# Patient Record
Sex: FEMALE | Race: WHITE | Employment: STUDENT | URBAN - METROPOLITAN AREA
[De-identification: names, ages, dates, MRNs, and addresses within clinical notes are randomized per-mention and may not be internally consistent; named-entity substitution may affect disease eponyms.]

---
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
## 2022-01-08 ENCOUNTER — HEALTH MAINTENANCE LETTER (OUTPATIENT)
Age: 22
End: 2022-01-08

## 2022-03-21 ENCOUNTER — LAB REQUISITION (OUTPATIENT)
Dept: LAB | Facility: CLINIC | Age: 22
End: 2022-03-21

## 2022-03-21 PROCEDURE — 88342 IMHCHEM/IMCYTCHM 1ST ANTB: CPT | Mod: TC | Performed by: DENTIST

## 2022-07-15 NOTE — PROGRESS NOTES
Progress Note    SUBJECTIVE:  Ruth Cortes is an 22 year old, , who requests an Annual Preventive Exam.     Concerns today include:     1. Genital bump/ lesion: Pt complains of a bump that has been there since the beginning of . She thought it was a pimple, she popped it and pus came out of it. Now it's less swollen but still present. Denies heat, pain or tenderness. States she does shave that area.      Social Hx:  - Reports her mental health is pretty good. She has adequate coping mechanisms with journal and talking with friends/family. Feels supported by friends and family.   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 Attends [employer] where she is on the swim team. She had a very successful team this past school year.    Menstrual Hx:   - Regular periods  - Her cycle is roughly every 28 days   - Menses lasts 4-5 days with light bleeding and spots for 1-2 days following. Denies bleeding through a pad or tampon in less than a hour.   - Additional symptoms with her menses are occasional cramping that resolves with ibuprofen/tyelnol     Menstrual History:  Menstrual History 2021   LAST MENSTRUAL PERIOD 2021 -   Menarche Age - - -   Period Cycle (Days) - - 28   Period Duration (Days) - - 5-6   Method of Contraception - - None   Period Pattern - - Regular   Menstrual Flow - - Moderate   Menstrual Control - - -   Dysmenorrhea - - -   PMS Symptoms - - -   Reviewed Today - - Yes     Sexual Hx:   - Currently sexually active with male partners  - Has had 2 partners in the last year  - Desires STD testing   - Uses Mirena IUD for birth control: placed 2019; pleased with this contraceptive method.   - Denies sexual concerns     Diet/Exercise   - Doesn't follow a specific diet. Eats fruits/vegetables regularly. Eats chicken mainly for protein with red meat every so often and supplements with  protein powder.   - Exercises everyday with swimming and weightlifting.   - Eats significant amounts of calcium through milk and cheese.   - Doesn't take any vitamins or supplements.     Immunizations up to date     Pap smear not indicated until the age of 23    HISTORY:  albuterol (PROAIR HFA/PROVENTIL HFA/VENTOLIN HFA) 108 (90 Base) MCG/ACT inhaler, Inhale 1-2 puffs into the lungs every 6 hours as needed for shortness of breath / dyspnea or wheezing    No current facility-administered medications on file prior to visit.    Allergies   Allergen Reactions     No Known Allergies      Immunization History   Administered Date(s) Administered     COVID-19,PF,Pfizer (12+ Yrs) 2021, 2021     DTAP (<7y) 2002, 2002, 2003, 10/07/2003, 2007     FLU 6-35 months 10/31/2006, 2009, 2010     HEPA 2005, 10/31/2006     HPV 2013, 2016     Hep B, Peds or Adolescent 2002, 2003     HepA-ped 2 Dose 2005, 10/31/2006     HepB 2002, 2002, 2003     Hib (PRP-T) 2002, 2002, 2003, 10/07/2003     Influenza (H1N1) 2009, 2010     Influenza (IIV3) PF 2003, 10/26/2005, 10/31/2006, 10/30/2007, 2009, 2010     MMR 2003, 2007     Meningococcal (Menactra ) 2013, 2018     Meningococcal (Menveo ) 2013     Pneumococcal (PCV 7) 2002, 2002, 2003, 10/07/2003     Poliovirus, inactivated (IPV) 2002, 2002, 2003, 2007     TDAP Vaccine (Boostrix) 2013     Varicella 2003, 2007       OB History    Para Term  AB Living   0 0 0 0 0 0   SAB IAB Ectopic Multiple Live Births   0 0 0 0 0     Past Medical History:   Diagnosis Date     Skin reaction to the sun 2006     Past Surgical History:   Procedure Laterality Date     NO HISTORY OF SURGERY       Family History   Problem Relation Age of Onset     Hypertension  "Maternal Grandmother      Hypertension Maternal Uncle      Breast Cancer No family hx of      Colon Cancer No family hx of      Thyroid Disease No family hx of      Ovarian Cancer No family hx of      Social History     Socioeconomic History     Marital status: Single     Spouse name: None     Number of children: None     Years of education: None     Highest education level: None   Tobacco Use     Smoking status: Never Smoker     Smokeless tobacco: Never Used   Substance and Sexual Activity     Alcohol use: No     Drug use: No     Sexual activity: Never   Social History Narrative    Family Information:    Parent #1    Name: Michelle    Education: college   Occupation: HR rep        Relationship Status of Parent(s): single    Who does the child live with? mother and sister(s)    What language(s) is/are spoken at home? English        How much exercise per week? Swimming daily    How much calcium per day? In foods       How much caffeine per day? 0    How much vitamin D per day? In foods and sunligth    Do you/your family wear seatbelts?  Yes    Do you/your family use safety helmets? Yes    Do you/your family use sunscreen? Yes    Do you/your family keep firearms in the home? No    Do you/your family have a smoke detector(s)? Yes        Reviewed [facility] 3-           Review of Systems     All other systems reviewed and are negative.    PHQ-9 SCORE 3/28/2018 11/5/2018 9/10/2019   PHQ-9 Total Score 1 - 2   PHQ-A Total Score - 1 -     PHQ-2 Score:     PHQ-2 ( 1999 Pfizer) 9/10/2019 8/1/2018   Q1: Little interest or pleasure in doing things 0 0   Q2: Feeling down, depressed or hopeless 0 0   PHQ-2 Score 0 0   PHQ-2 Total Score (12-17 Years)- Positive if 3 or more points; Administer PHQ-A if positive 0 -     MAIRA-7 SCORE 3/28/2018 9/10/2019   Total Score 3 6     EXAM:  Blood pressure 129/89, pulse 75, height 1.702 m (5' 7\"), weight 66.4 kg (146 lb 6.4 oz), last menstrual period 07/11/2022, not currently breastfeeding. " Body mass index is 22.93 kg/m .  General - pleasant female in no acute distress.  Skin - no suspicious lesions or rashes  EENT-   euthyroid with out palpable nodules  Neck - supple without lymphadenopathy.  Lungs - clear to auscultation bilaterally.  Heart - regular rate and rhythm without murmur.  Abdomen - soft, nontender, nondistended, no masses or organomegaly noted.  Musculoskeletal - no gross deformities.  Neurological - normal strength, sensation, and mental status.    Breast Exam:  Deferred due to age < 25 years old.       Pelvic Exam:  EG/BUS: Small skin colored scabbed lesion on mons consistent with healing folliculitis lesion; Normal genital architecture without erythema or abnormal secretions; Bartholin's, Urethra, Bonaparte's normal   Urethral meatus: normal  Urethra: no masses, tenderness, or scarring    IUD strings palpable on bimanual exam.     ASSESSMENT:  Encounter Diagnoses   Name Primary?     Visit for preventive health examination Yes     Screen for STD (sexually transmitted disease)      Genital lesion, female         PLAN:   - STD screening collected.   - Discussed initiation of cervical cancer screening with a pap smear at the age of 23.   - Educated on techniques to avoid recurrence of folliculitis such as avoidance of shaving that area and good hygiene.   - Normal IUD string check. Due for removal/ replacement by 2026.     Additional teaching done at this visit regarding calcium (1200 mg per day), exercise, birth control, mental health and weight/diet.    Return to clinic in one year.  Follow-up as needed.    I, Nicol Mccallum, am serving as a scribe; to document services personally performed by  Vero Brewer based on data collection and the provider's statements to me.     Nicol Mccallum, BSN, RN, [facility]     I agree with the PFSH and ROS as completed by the NP Student, except for changes made by me.  The remainder of the encounter was performed by me and scribed by the  WHNP Student.  The scribed note accurately reflects my personal services and decisions made by me.  Vero Brewer, DNP, APRN, WHNP

## 2022-07-21 ENCOUNTER — OFFICE VISIT (OUTPATIENT)
Dept: OBGYN | Facility: CLINIC | Age: 22
End: 2022-07-21
Attending: NURSE PRACTITIONER
Payer: COMMERCIAL

## 2022-07-21 VITALS
SYSTOLIC BLOOD PRESSURE: 129 MMHG | BODY MASS INDEX: 22.98 KG/M2 | HEART RATE: 75 BPM | HEIGHT: 67 IN | DIASTOLIC BLOOD PRESSURE: 89 MMHG | WEIGHT: 146.4 LBS

## 2022-07-21 DIAGNOSIS — Z00.00 VISIT FOR PREVENTIVE HEALTH EXAMINATION: Primary | ICD-10-CM

## 2022-07-21 DIAGNOSIS — N94.9 GENITAL LESION, FEMALE: ICD-10-CM

## 2022-07-21 DIAGNOSIS — Z11.3 SCREEN FOR STD (SEXUALLY TRANSMITTED DISEASE): ICD-10-CM

## 2022-07-21 DIAGNOSIS — Z30.431 INTRAUTERINE DEVICE SURVEILLANCE: ICD-10-CM

## 2022-07-21 PROCEDURE — G0463 HOSPITAL OUTPT CLINIC VISIT: HCPCS

## 2022-07-21 PROCEDURE — 87591 N.GONORRHOEAE DNA AMP PROB: CPT | Performed by: NURSE PRACTITIONER

## 2022-07-21 PROCEDURE — 99395 PREV VISIT EST AGE 18-39: CPT | Performed by: NURSE PRACTITIONER

## 2022-07-21 PROCEDURE — 87491 CHLMYD TRACH DNA AMP PROBE: CPT | Performed by: NURSE PRACTITIONER

## 2022-07-22 LAB
C TRACH DNA SPEC QL NAA+PROBE: NEGATIVE
N GONORRHOEA DNA SPEC QL NAA+PROBE: NEGATIVE

## 2022-11-20 ENCOUNTER — HEALTH MAINTENANCE LETTER (OUTPATIENT)
Age: 22
End: 2022-11-20

## 2023-07-11 ENCOUNTER — PATIENT OUTREACH (OUTPATIENT)
Dept: CARE COORDINATION | Facility: CLINIC | Age: 23
End: 2023-07-11
Payer: COMMERCIAL

## 2023-07-25 ENCOUNTER — PATIENT OUTREACH (OUTPATIENT)
Dept: CARE COORDINATION | Facility: CLINIC | Age: 23
End: 2023-07-25
Payer: COMMERCIAL

## 2023-09-16 ENCOUNTER — HEALTH MAINTENANCE LETTER (OUTPATIENT)
Age: 23
End: 2023-09-16

## 2024-05-13 ASSESSMENT — ANXIETY QUESTIONNAIRES
2. NOT BEING ABLE TO STOP OR CONTROL WORRYING: NOT AT ALL
5. BEING SO RESTLESS THAT IT IS HARD TO SIT STILL: NOT AT ALL
7. FEELING AFRAID AS IF SOMETHING AWFUL MIGHT HAPPEN: NOT AT ALL
GAD7 TOTAL SCORE: 0
GAD7 TOTAL SCORE: 0
6. BECOMING EASILY ANNOYED OR IRRITABLE: NOT AT ALL
1. FEELING NERVOUS, ANXIOUS, OR ON EDGE: NOT AT ALL
7. FEELING AFRAID AS IF SOMETHING AWFUL MIGHT HAPPEN: NOT AT ALL
4. TROUBLE RELAXING: NOT AT ALL
8. IF YOU CHECKED OFF ANY PROBLEMS, HOW DIFFICULT HAVE THESE MADE IT FOR YOU TO DO YOUR WORK, TAKE CARE OF THINGS AT HOME, OR GET ALONG WITH OTHER PEOPLE?: NOT DIFFICULT AT ALL
IF YOU CHECKED OFF ANY PROBLEMS ON THIS QUESTIONNAIRE, HOW DIFFICULT HAVE THESE PROBLEMS MADE IT FOR YOU TO DO YOUR WORK, TAKE CARE OF THINGS AT HOME, OR GET ALONG WITH OTHER PEOPLE: NOT DIFFICULT AT ALL
3. WORRYING TOO MUCH ABOUT DIFFERENT THINGS: NOT AT ALL

## 2024-05-14 ENCOUNTER — OFFICE VISIT (OUTPATIENT)
Dept: OBGYN | Facility: CLINIC | Age: 24
End: 2024-05-14
Attending: NURSE PRACTITIONER
Payer: COMMERCIAL

## 2024-05-14 VITALS
WEIGHT: 163 LBS | BODY MASS INDEX: 25.58 KG/M2 | HEIGHT: 67 IN | SYSTOLIC BLOOD PRESSURE: 139 MMHG | HEART RATE: 93 BPM | DIASTOLIC BLOOD PRESSURE: 89 MMHG

## 2024-05-14 DIAGNOSIS — Z12.4 SCREENING FOR CERVICAL CANCER: ICD-10-CM

## 2024-05-14 DIAGNOSIS — Z00.00 VISIT FOR PREVENTIVE HEALTH EXAMINATION: Primary | ICD-10-CM

## 2024-05-14 DIAGNOSIS — Z30.431 INTRAUTERINE DEVICE SURVEILLANCE: ICD-10-CM

## 2024-05-14 PROCEDURE — 99213 OFFICE O/P EST LOW 20 MIN: CPT | Performed by: NURSE PRACTITIONER

## 2024-05-14 PROCEDURE — G0145 SCR C/V CYTO,THINLAYER,RESCR: HCPCS | Performed by: NURSE PRACTITIONER

## 2024-05-14 PROCEDURE — 99395 PREV VISIT EST AGE 18-39: CPT | Performed by: NURSE PRACTITIONER

## 2024-05-14 NOTE — PATIENT INSTRUCTIONS
Thank you for trusting us with your care!     If you need to contact us for questions about:  Symptoms, Scheduling & Medical Questions; Non-urgent (2-3 day response) Amy message, Urgent (needing response today) [phone] (if after 3:30pm next day response)   Prescriptions: Please call your Pharmacy   Billing: [place] [phone] or [facility]:[phone]

## 2024-05-14 NOTE — PROGRESS NOTES
Progress Note    SUBJECTIVE:  Ruth Cortes is an 23 year old, , who requests an Annual Preventive Exam.     Concerns today include:     Social Hx:  - Reports her mental health is pretty good. Feels supported by friends and family.   - Graduated college. Done swimming. 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      Menstrual Hx:   - Irregular periods; still light with the LNG-IUD, bleeds for about 2 days. Episodes are 1-5 months apart. No significant dysmenorrhea.     Sexual Hx:   - Currently sexually active with male partners  - Has had 1 partner in the last year  - Declines STD testing, had it done recently   - Uses Mirena IUD for birth control: placed 2019; pleased with this contraceptive method.   - Denies sexual concerns      Diet/Exercise   - Doesn't follow a specific diet. Overall well balanced; good some calcium in her diet  - Exercises: yoga and walking, getting into a routine now that swim team is done     Immunizations: Tdap      Pap smear: due for first  Mammogram: n/a  Colonoscopy: n/a    Menstrual History:      2022     3:24 PM 2022     3:30 PM 2024     2:20 PM   Menstrual History   LAST MENSTRUAL PERIOD  2022   Period Cycle (Days) 28     Period Duration (Days) 5-6     Method of Contraception None     Period Pattern Regular     Menstrual Flow Moderate     Reviewed Today Yes       HISTORY:  Current Outpatient Medications   Medication Sig Dispense Refill    albuterol (PROAIR HFA/PROVENTIL HFA/VENTOLIN HFA) 108 (90 Base) MCG/ACT inhaler Inhale 1-2 puffs into the lungs every 6 hours as needed for shortness of breath / dyspnea or wheezing 18 g 3    levonorgestrel (MIRENA) 52 MG (20 mcg/day) IUD        No current facility-administered medications for this visit.     Allergies   Allergen Reactions    No Known Allergies      Immunization History   Administered Date(s)  Administered    COVID-19 MONOVALENT 12+ (Pfizer) 2021, 2021    DTAP (<7y) 2002, 2002, 2003, 10/07/2003, 2007    Flu, Unspecified 10/21/2020    HEPA 2005, 10/31/2006    HEPATITIS A (PEDS 12M-18Y) 2005, 10/31/2006    HIB (PRP-T) 2002, 2002, 2003, 10/07/2003    HPV 2013, 2016    HepB 2002, 2002, 2003    Hepatitis B, Peds 2002, 2003    Influenza (H1N1) 2009, 2010    Influenza (IIV3) PF 2003, 10/26/2005, 10/31/2006, 10/30/2007, 2009, 2010    Influenza, seasonal, injectable, PF 10/31/2006, 2009, 2010    MMR 2003, 2007    Meningococcal ACWY (Menactra ) 2013, 2018    Meningococcal ACWY (Menveo ) 2013    Pneumococcal (PCV 7) 2002, 2002, 2003, 10/07/2003    Poliovirus, inactivated (IPV) 2002, 2002, 2003, 2007    TDAP (Adacel,Boostrix) 2024    TDAP Vaccine (Boostrix) 2013    Varicella 2003, 2007       OB History    Para Term  AB Living   0 0 0 0 0 0   SAB IAB Ectopic Multiple Live Births   0 0 0 0 0     Past Medical History:   Diagnosis Date    Hypertension     Skin reaction to the sun 2006     Past Surgical History:   Procedure Laterality Date    NO HISTORY OF SURGERY       Family History   Problem Relation Age of Onset    Hypertension Maternal Grandmother     Hypertension Maternal Uncle     Breast Cancer No family hx of     Colon Cancer No family hx of     Thyroid Disease No family hx of     Ovarian Cancer No family hx of      Social History     Socioeconomic History    Marital status: Single     Spouse name: None    Number of children: None    Years of education: None    Highest education level: None   Tobacco Use    Smoking status: Never    Smokeless tobacco: Never   Vaping Use    Vaping status: Never Used   Substance and Sexual Activity    Alcohol use: Yes      "Comment: 5 drinks a week    Drug use: No    Sexual activity: Yes     Partners: Male     Birth control/protection: Condom, I.U.D.   Social History Narrative    Family Information:    Parent #1    Name: Michelle    Education: college   Occupation: HR rep        Relationship Status of Parent(s): single    Who does the child live with? mother and sister(s)    What language(s) is/are spoken at home? English        How much exercise per week? Swimming daily    How much calcium per day? In foods       How much caffeine per day? 0    How much vitamin D per day? In foods and sunligth    Do you/your family wear seatbelts?  Yes    Do you/your family use safety helmets? Yes    Do you/your family use sunscreen? Yes    Do you/your family keep firearms in the home? No    Do you/your family have a smoke detector(s)? Yes        Reviewed [facility] 3-           ROS  ROS: 10 point ROS neg other than the symptoms noted above in the HPI.        3/28/2018     1:29 PM 11/5/2018     2:04 PM 9/10/2019    11:40 AM   PHQ-9 SCORE   PHQ-9 Total Score 1  2   PHQ-A Total Score  1    PHQ-2 Score:         5/13/2024    10:07 PM 9/10/2019    11:40 AM   PHQ-2 ( 1999 Pfizer)   Q1: Little interest or pleasure in doing things 0 0   Q2: Feeling down, depressed or hopeless 0 0   PHQ-2 Score 0 0   PHQ-2 Total Score (12-17 Years)- Positive if 3 or more points; Administer PHQ-A if positive  0   Q1: Little interest or pleasure in doing things Not at all    Q2: Feeling down, depressed or hopeless Not at all    PHQ-2 Score 0            3/28/2018     1:29 PM 9/10/2019    11:40 AM 5/13/2024    10:07 PM   MAIRA-7 SCORE   Total Score   0 (minimal anxiety)   Total Score 3 6 0       EXAM:  Blood pressure 139/89, pulse 93, height 1.702 m (5' 7\"), weight 73.9 kg (163 lb), last menstrual period 05/01/2024, not currently breastfeeding. Body mass index is 25.53 kg/m .  General - pleasant female in no acute distress.  Skin - no suspicious lesions or rashes  EENT-  " euthyroid with out palpable nodules  Neck - supple without lymphadenopathy.  Lungs - clear to auscultation bilaterally.  Heart - regular rate and rhythm without murmur.  Abdomen - soft, nontender, nondistended, no masses or organomegaly noted.  Musculoskeletal - no gross deformities.  Neurological - normal strength, sensation, and mental status.    Breast Exam: deferred due to age <25    Pelvic Exam:  EG/BUS: Normal genital architecture without lesions, erythema or abnormal secretions; Bartholin's, Urethra, Shubert's normal   Urethral meatus: normal   Urethra: no masses, tenderness, or scarring   Vagina: moist, pink, rugae with creamy, white, and odorless secretions; IUD strings extend 3cm from the external cervical os  Cervix: pink, moist, closed, without lesion   Rectum: anus normal     ASSESSMENT:  Encounter Diagnoses   Name Primary?    Visit for preventive health examination Yes    Screening for cervical cancer     Intrauterine device surveillance         PLAN:   Orders Placed This Encounter   Procedures    TDAP VACCINE (Adacel, Boostrix)  [[id number]]    Gynecologic Cytology (PAP Smear)     Defers lipid profile today  Tdap given  Pap smear today  Normal IUD string check; due for removal/ replacement 2027  Additional teaching done at this visit regarding calcium (1200 mg per day), self breast awareness, exercise, mental health.     Return to clinic in one year.  Follow-up as needed.  Vero Brewer, DNP, APRN, WHNP

## 2024-05-14 NOTE — LETTER
2024       RE: Ruth Cortes  [address]  [facility] [address]     Dear Colleague,    Thank you for referring your patient, Ruth Cortes, to the [facility] [place] at [facility]. Please see a copy of my visit note below.    Progress Note    SUBJECTIVE:  Ruth Cortes is an 23 year old, , who requests an Annual Preventive Exam.     Concerns today include:     Social Hx:  - Reports her mental health is pretty good. Feels supported by friends and family.   - Graduated college. Done swimming. 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      Menstrual Hx:   - Irregular periods; still light with the LNG-IUD, bleeds for about 2 days. Episodes are 1-5 months apart. No significant dysmenorrhea.     Sexual Hx:   - Currently sexually active with male partners  - Has had 1 partner in the last year  - Declines STD testing, had it done recently   - Uses Mirena IUD for birth control: placed 2019; pleased with this contraceptive method.   - Denies sexual concerns      Diet/Exercise   - Doesn't follow a specific diet. Overall well balanced; good some calcium in her diet  - Exercises: yoga and walking, getting into a routine now that swim team is done     Immunizations: Tdap      Pap smear: due for first  Mammogram: n/a  Colonoscopy: n/a    Menstrual History:      2022     3:24 PM 2022     3:30 PM 2024     2:20 PM   Menstrual History   LAST MENSTRUAL PERIOD  2022   Period Cycle (Days) 28     Period Duration (Days) 5-6     Method of Contraception None     Period Pattern Regular     Menstrual Flow Moderate     Reviewed Today Yes       HISTORY:  Current Outpatient Medications   Medication Sig Dispense Refill    albuterol (PROAIR HFA/PROVENTIL HFA/VENTOLIN HFA) 108 (90 Base) MCG/ACT inhaler Inhale 1-2  puffs into the lungs every 6 hours as needed for shortness of breath / dyspnea or wheezing 18 g 3    levonorgestrel (MIRENA) 52 MG (20 mcg/day) IUD        No current facility-administered medications for this visit.     Allergies   Allergen Reactions    No Known Allergies      Immunization History   Administered Date(s) Administered    COVID-19 MONOVALENT 12+ (Pfizer) 2021, 2021    DTAP (<7y) 2002, 2002, 2003, 10/07/2003, 2007    Flu, Unspecified 10/21/2020    HEPA 2005, 10/31/2006    HEPATITIS A (PEDS 12M-18Y) 2005, 10/31/2006    HIB (PRP-T) 2002, 2002, 2003, 10/07/2003    HPV 2013, 2016    HepB 2002, 2002, 2003    Hepatitis B, Peds 2002, 2003    Influenza (H1N1) 2009, 2010    Influenza (IIV3) PF 2003, 10/26/2005, 10/31/2006, 10/30/2007, 2009, 2010    Influenza, seasonal, injectable, PF 10/31/2006, 2009, 2010    MMR 2003, 2007    Meningococcal ACWY (Menactra ) 2013, 2018    Meningococcal ACWY (Menveo ) 2013    Pneumococcal (PCV 7) 2002, 2002, 2003, 10/07/2003    Poliovirus, inactivated (IPV) 2002, 2002, 2003, 2007    TDAP (Adacel,Boostrix) 2024    TDAP Vaccine (Boostrix) 2013    Varicella 2003, 2007       OB History    Para Term  AB Living   0 0 0 0 0 0   SAB IAB Ectopic Multiple Live Births   0 0 0 0 0     Past Medical History:   Diagnosis Date    Hypertension     Skin reaction to the sun 2006     Past Surgical History:   Procedure Laterality Date    NO HISTORY OF SURGERY       Family History   Problem Relation Age of Onset    Hypertension Maternal Grandmother     Hypertension Maternal Uncle     Breast Cancer No family hx of     Colon Cancer No family hx of     Thyroid Disease No family hx of     Ovarian Cancer No family hx of      Social History      Socioeconomic History    Marital status: Single     Spouse name: None    Number of children: None    Years of education: None    Highest education level: None   Tobacco Use    Smoking status: Never    Smokeless tobacco: Never   Vaping Use    Vaping status: Never Used   Substance and Sexual Activity    Alcohol use: Yes     Comment: 5 drinks a week    Drug use: No    Sexual activity: Yes     Partners: Male     Birth control/protection: Condom, I.U.D.   Social History Narrative    Family Information:    Parent #1    Name: Michelle    Education: college   Occupation: HR rep        Relationship Status of Parent(s): single    Who does the child live with? mother and sister(s)    What language(s) is/are spoken at home? English        How much exercise per week? Swimming daily    How much calcium per day? In foods       How much caffeine per day? 0    How much vitamin D per day? In foods and sunligth    Do you/your family wear seatbelts?  Yes    Do you/your family use safety helmets? Yes    Do you/your family use sunscreen? Yes    Do you/your family keep firearms in the home? No    Do you/your family have a smoke detector(s)? Yes        [facility]           ROS  ROS: 10 point ROS neg other than the symptoms noted above in the HPI.        3/28/2018     1:29 PM 11/5/2018     2:04 PM 9/10/2019    11:40 AM   PHQ-9 SCORE   PHQ-9 Total Score 1  2   PHQ-A Total Score  1    PHQ-2 Score:         5/13/2024    10:07 PM 9/10/2019    11:40 AM   PHQ-2 ( 1999 Pfizer)   Q1: Little interest or pleasure in doing things 0 0   Q2: Feeling down, depressed or hopeless 0 0   PHQ-2 Score 0 0   PHQ-2 Total Score (12-17 Years)- Positive if 3 or more points; Administer PHQ-A if positive  0   Q1: Little interest or pleasure in doing things Not at all    Q2: Feeling down, depressed or hopeless Not at all    PHQ-2 Score 0            3/28/2018     1:29 PM 9/10/2019    11:40 AM 5/13/2024    10:07 PM   MAIRA-7 SCORE   Total Score   0  "(minimal anxiety)   Total Score 3 6 0       EXAM:  Blood pressure 139/89, pulse 93, height 1.702 m (5' 7\"), weight 73.9 kg (163 lb), last menstrual period 05/01/2024, not currently breastfeeding. Body mass index is 25.53 kg/m .  General - pleasant female in no acute distress.  Skin - no suspicious lesions or rashes  EENT-  euthyroid with out palpable nodules  Neck - supple without lymphadenopathy.  Lungs - clear to auscultation bilaterally.  Heart - regular rate and rhythm without murmur.  Abdomen - soft, nontender, nondistended, no masses or organomegaly noted.  Musculoskeletal - no gross deformities.  Neurological - normal strength, sensation, and mental status.    Breast Exam: deferred due to age <25    Pelvic Exam:  EG/BUS: Normal genital architecture without lesions, erythema or abnormal secretions; Bartholin's, Urethra, Clarkfield's normal   Urethral meatus: normal   Urethra: no masses, tenderness, or scarring   Vagina: moist, pink, rugae with creamy, white, and odorless secretions; IUD strings extend 3cm from the external cervical os  Cervix: pink, moist, closed, without lesion   Rectum: anus normal     ASSESSMENT:  Encounter Diagnoses   Name Primary?    Visit for preventive health examination Yes    Screening for cervical cancer     Intrauterine device surveillance         PLAN:   Orders Placed This Encounter   Procedures    TDAP VACCINE (Adacel, Boostrix)  [[id number]]    Gynecologic Cytology (PAP Smear)     Defers lipid profile today  Tdap given  Pap smear today  Normal IUD string check; due for removal/ replacement 2027  Additional teaching done at this visit regarding calcium (1200 mg per day), self breast awareness, exercise, mental health.     Return to clinic in one year.  Follow-up as needed.  Vero Brewer, DNP, APRN, WHNP      "

## 2024-05-17 LAB
BKR LAB AP GYN ADEQUACY: NORMAL
BKR LAB AP GYN INTERPRETATION: NORMAL
BKR LAB AP HPV REFLEX: NO
BKR LAB AP LMP: NORMAL
BKR LAB AP PREVIOUS ABNORMAL: NORMAL
PATH REPORT.COMMENTS IMP SPEC: NORMAL
PATH REPORT.COMMENTS IMP SPEC: NORMAL
PATH REPORT.RELEVANT HX SPEC: NORMAL

## 2025-02-19 ENCOUNTER — OFFICE VISIT (OUTPATIENT)
Dept: URGENT CARE | Facility: URGENT CARE | Age: 25
End: 2025-02-19
Payer: COMMERCIAL

## 2025-02-19 VITALS
HEART RATE: 81 BPM | HEIGHT: 67 IN | WEIGHT: 145 LBS | TEMPERATURE: 98.4 F | SYSTOLIC BLOOD PRESSURE: 119 MMHG | BODY MASS INDEX: 22.76 KG/M2 | RESPIRATION RATE: 16 BRPM | DIASTOLIC BLOOD PRESSURE: 82 MMHG | OXYGEN SATURATION: 99 %

## 2025-02-19 DIAGNOSIS — R30.0 DYSURIA: Primary | ICD-10-CM

## 2025-02-19 LAB
ALBUMIN UR-MCNC: NEGATIVE MG/DL
APPEARANCE UR: CLEAR
BILIRUB UR QL STRIP: NEGATIVE
COLOR UR AUTO: YELLOW
GLUCOSE UR STRIP-MCNC: NEGATIVE MG/DL
HGB UR QL STRIP: NEGATIVE
KETONES UR STRIP-MCNC: NEGATIVE MG/DL
LEUKOCYTE ESTERASE UR QL STRIP: NEGATIVE
NITRATE UR QL: NEGATIVE
PH UR STRIP: 5.5 [PH] (ref 5–7)
SP GR UR STRIP: <=1.005 (ref 1–1.03)
UROBILINOGEN UR STRIP-ACNC: 0.2 E.U./DL

## 2025-02-19 PROCEDURE — 99203 OFFICE O/P NEW LOW 30 MIN: CPT | Performed by: NURSE PRACTITIONER

## 2025-02-19 PROCEDURE — 81003 URINALYSIS AUTO W/O SCOPE: CPT

## 2025-02-20 NOTE — PROGRESS NOTES
"Chief Complaint   Patient presents with    Urinary Problem     Urinary symptoms x2 days, c/o urinary burning, urgency, and frequency     SUBJECTIVE:  Ruth Cortes is a 24 year old female presenting with urinary burning frequency urgency over the past 2 days.  Symptoms are mild and subtle.  Patient has had UTIs before and this is similar, but not as bad.  Recent new sexual partner, but no concern for STDs or pregnancy.  Declines fever sweats chills nausea vomiting flank pain vaginal discharge swelling itch.    Past Medical History:   Diagnosis Date    Hypertension     Skin reaction to the sun 01/11/2006     Current Outpatient Medications   Medication Sig Dispense Refill    levonorgestrel (MIRENA) 52 MG (20 mcg/day) IUD       albuterol (PROAIR HFA/PROVENTIL HFA/VENTOLIN HFA) 108 (90 Base) MCG/ACT inhaler Inhale 1-2 puffs into the lungs every 6 hours as needed for shortness of breath / dyspnea or wheezing (Patient not taking: Reported on 2/19/2025) 18 g 3     No current facility-administered medications for this visit.     Social History     Tobacco Use    Smoking status: Never     Passive exposure: Never    Smokeless tobacco: Never   Substance Use Topics    Alcohol use: Yes     Comment: 5 drinks a week     Allergies   Allergen Reactions    No Known Allergies        Review of Systems   ROS: 10 point ROS neg other than the symptoms noted above in the HPI.    OBJECTIVE:   /82 (BP Location: Right arm, Patient Position: Chair, Cuff Size: Adult Regular)   Pulse 81   Temp 98.4  F (36.9  C) (Oral)   Resp 16   Ht 1.702 m (5' 7\")   Wt 65.8 kg (145 lb)   SpO2 99%   BMI 22.71 kg/m      Physical Exam  Constitutional:       General: She is not in acute distress.     Appearance: She is well-developed. She is not diaphoretic.   Cardiovascular:      Pulses: Normal pulses.   Pulmonary:      Effort: Pulmonary effort is normal.   Abdominal:      General: Bowel sounds are normal.      Palpations: Abdomen is soft.      " Tenderness: There is no abdominal tenderness. There is no right CVA tenderness or left CVA tenderness.   Musculoskeletal:         General: Normal range of motion.   Skin:     General: Skin is warm and dry.      Capillary Refill: Capillary refill takes less than 2 seconds.      Coloration: Skin is not pale.   Neurological:      General: No focal deficit present.      Mental Status: She is alert and oriented to person, place, and time.   Psychiatric:         Mood and Affect: Mood normal.         Behavior: Behavior normal.       Results for orders placed or performed in visit on 02/19/25   UA Macroscopic with reflex to Microscopic and Culture - Lab Collect     Status: Normal    Specimen: Urine, Clean Catch   Result Value Ref Range    Color Urine Yellow Colorless, Straw, Light Yellow, Yellow    Appearance Urine Clear Clear    Glucose Urine Negative Negative mg/dL    Bilirubin Urine Negative Negative    Ketones Urine Negative Negative mg/dL    Specific Gravity Urine <=1.005 1.003 - 1.035    Blood Urine Negative Negative    pH Urine 5.5 5.0 - 7.0    Protein Albumin Urine Negative Negative mg/dL    Urobilinogen Urine 0.2 0.2, 1.0 E.U./dL    Nitrite Urine Negative Negative    Leukocyte Esterase Urine Negative Negative    Narrative    Microscopic not indicated     ASSESSMENT:    ICD-10-CM    1. Dysuria  R30.0 UA Macroscopic with reflex to Microscopic and Culture - Lab Collect     UA Macroscopic with reflex to Microscopic and Culture - Lab Collect        PLAN:     Urinalysis is clear and normal  No leukocytes nitrites white blood cells bacteria concerning for true UTI  Discussed bladder irritants with patient such as chemical exposures sexual activity dehydration excess coffee tea chocolate bubbly drinks spicy food tight yoga pants  Recommend cotton underwear open to air at bedtime and bathing with just water  Patient declines any further testing here today  Reevaluation if new or worsening symptoms    Follow up with primary  care provider with any problems, questions or concerns or if symptoms worsen or fail to improve. Patient agreed to plan and verbalized understanding.    ENEDINA Diaz-BC  [facility]

## 2025-02-20 NOTE — PATIENT INSTRUCTIONS
Urinalysis is clear and normal  No leukocytes nitrites white blood cells bacteria concerning for true UTI  Discussed bladder irritants with patient such as chemical exposures sexual activity dehydration excess coffee tea chocolate bubbly drinks spicy food tight yoga pants  Recommend cotton underwear open to air at bedtime and bathing with just water  Patient declines any further testing here today  Reevaluation if new or worsening symptoms

## 2025-04-14 ENCOUNTER — PATIENT OUTREACH (OUTPATIENT)
Dept: CARE COORDINATION | Facility: CLINIC | Age: 25
End: 2025-04-14
Payer: COMMERCIAL

## 2025-04-21 NOTE — PROGRESS NOTES
[facility]    CC: Routine STI screening, UTI symptoms    Subjective:   Ruth Cortes is a 24 year old  who presents for routine STI screening, as well as some recent UTI symptoms. She has some burning/discomfort near her urethra since Saturday. She notes that it is not with urination, but generally present mildly since Saturday. No other concerns; no abnormal vaginal discharge, vaginal discomfort, irritation. No concern for exposure to STI.     Screening tests:   Pap smear history: NILM 2024  HIV, Hep C, Hep B, RPR today  Lipid and diabetes per PCP    Immunizations, reviewed: due for meningitis B vaccine    OBSTETRIC/GYNECOLOGIC HISTORY  OB History    Para Term  AB Living   0 0 0 0 0 0   SAB IAB Ectopic Multiple Live Births   0 0 0 0 0       LMP: Patient's last menstrual period was 03/15/2025 (approximate).  Menarche: age 13  Menses: Minimal bleeding with Mirena IUD  Current contraception: Mirena IUD, placed 2019  Sexual activity: sexually active  History of STIs: None. She is interested in STI screening today.   History of abnormal Pap smear: No    PAST MEDICAL HISTORY  Past Medical History:   Diagnosis Date    Hypertension     Skin reaction to the sun 2006       MEDICATIONS  Current Outpatient Medications   Medication Sig Dispense Refill    levonorgestrel (MIRENA) 52 MG (20 mcg/day) IUD       albuterol (PROAIR HFA/PROVENTIL HFA/VENTOLIN HFA) 108 (90 Base) MCG/ACT inhaler Inhale 1-2 puffs into the lungs every 6 hours as needed for shortness of breath / dyspnea or wheezing (Patient not taking: Reported on 2025) 18 g 3     No current facility-administered medications for this visit.       ALLERGIES  Allergies   Allergen Reactions    No Known Allergies        PAST SURGICAL HISTORY   Past Surgical History:   Procedure Laterality Date    NO HISTORY OF SURGERY         SOCIAL HISTORY    Social History     Tobacco Use    Smoking status: Never     Passive  "exposure: Never    Smokeless tobacco: Never   Vaping Use    Vaping status: Never Used   Substance Use Topics    Alcohol use: Yes     Comment: 5 drinks a week    Drug use: No     Social History     Social History Narrative    Family Information:    Parent #1    Name: Michelle    Education: college   Occupation: HR rep        Relationship Status of Parent(s): single    Who does the child live with? mother and sister(s)    What language(s) is/are spoken at home? English        How much exercise per week? Swimming daily    How much calcium per day? In foods       How much caffeine per day? 0    How much vitamin D per day? In foods and sunligth    Do you/your family wear seatbelts?  Yes    Do you/your family use safety helmets? Yes    Do you/your family use sunscreen? Yes    Do you/your family keep firearms in the home? No    Do you/your family have a smoke detector(s)? Yes        [facility]           FAMILY HISTORY    Family History   Problem Relation Age of Onset    Hypertension Maternal Grandmother     Hypertension Maternal Uncle     Breast Cancer No family hx of     Colon Cancer No family hx of     Thyroid Disease No family hx of     Ovarian Cancer No family hx of        REVIEW OF SYSTEMS  A 10 point review of systems were all negative, except for pertinent positives noted in the above HPI.    OBJECTIVE:   /82   Pulse 76   Ht 1.702 m (5' 7\")   Wt 64.8 kg (142 lb 12.8 oz)   LMP 03/15/2025 (Approximate)   BMI 22.37 kg/m   (Initial /82)  General: Alert, without distress  HEENT: normocephalic  Cardiovascular: regular rate and rhythm  Lungs: unlabored breathing on room air  Pelvic: Small 1-2mm mole noted on right labia minora without concerning features. No ulcers or lesions noted on external genitalia. Vaginal mucosa well estrogenized with minimal discharge in the vaginal vault. Normal cervix without lesions/masses or discoloration. IUD strings visualized through os.  Extremities: " normal    ASSESSMENT AND PLAN  Ruth Cortes is a 24 year old  who presents for a routine STI screening and burning near her urethra.    Routine health screening  - STI Screening:   STD testing: GC/CT, trich; HIV, RPR, Hep B, Hep C collected today   Cervical cancer: last pap ; repeat   - Immunizations:   Last Tdap 2024   HPV completed    MMR completed    Varicella completed    Due for meningitis B vaccine; recommended [facility] appointment at [facility], will place order  - Contraception: Mirena IUD in place since     Urethral discomfort  - UA wnl, will not reflex to culture.   - Multiplex collected; will call patient if results are positive    Follow-up in 1 year, sooner as needed if any concerns. Patient seen and discussed with Dr. Hernadez.    Jessica Flores MD, MSc  [facility], PGY-1  2025 10:19 AM

## 2025-04-22 ENCOUNTER — OFFICE VISIT (OUTPATIENT)
Dept: OBGYN | Facility: CLINIC | Age: 25
End: 2025-04-22
Attending: OBSTETRICS & GYNECOLOGY
Payer: COMMERCIAL

## 2025-04-22 ENCOUNTER — LAB (OUTPATIENT)
Dept: LAB | Facility: CLINIC | Age: 25
End: 2025-04-22
Attending: OBSTETRICS & GYNECOLOGY
Payer: COMMERCIAL

## 2025-04-22 VITALS
BODY MASS INDEX: 22.41 KG/M2 | HEIGHT: 67 IN | DIASTOLIC BLOOD PRESSURE: 82 MMHG | WEIGHT: 142.8 LBS | HEART RATE: 76 BPM | SYSTOLIC BLOOD PRESSURE: 123 MMHG

## 2025-04-22 DIAGNOSIS — Z11.3 ROUTINE SCREENING FOR STI (SEXUALLY TRANSMITTED INFECTION): ICD-10-CM

## 2025-04-22 DIAGNOSIS — R30.0 DYSURIA: ICD-10-CM

## 2025-04-22 DIAGNOSIS — R30.0 DYSURIA: Primary | ICD-10-CM

## 2025-04-22 LAB
ALBUMIN UR-MCNC: NEGATIVE MG/DL
APPEARANCE UR: CLEAR
BACTERIAL VAGINOSIS VAG-IMP: NEGATIVE
BILIRUB UR QL STRIP: NEGATIVE
CANDIDA DNA VAG QL NAA+PROBE: NOT DETECTED
CANDIDA GLABRATA / CANDIDA KRUSEI DNA: NOT DETECTED
COLOR UR AUTO: YELLOW
GLUCOSE UR STRIP-MCNC: NEGATIVE MG/DL
HBV SURFACE AG SERPL QL IA: NONREACTIVE
HCV AB SERPL QL IA: NONREACTIVE
HGB UR QL STRIP: NEGATIVE
HIV 1+2 AB+HIV1 P24 AG SERPL QL IA: NONREACTIVE
KETONES UR STRIP-MCNC: NEGATIVE MG/DL
LEUKOCYTE ESTERASE UR QL STRIP: NEGATIVE
NITRATE UR QL: NEGATIVE
PH UR STRIP: 7 [PH] (ref 5–8)
SP GR UR STRIP: 1.01 (ref 1–1.03)
T PALLIDUM AB SER QL: NONREACTIVE
T VAGINALIS DNA VAG QL NAA+PROBE: NOT DETECTED
UROBILINOGEN UR STRIP-ACNC: 0.2 E.U./DL

## 2025-04-22 PROCEDURE — 36415 COLL VENOUS BLD VENIPUNCTURE: CPT

## 2025-04-22 PROCEDURE — 81515 NFCT DS BV&VAGINITIS DNA ALG: CPT | Performed by: OBSTETRICS & GYNECOLOGY

## 2025-04-22 PROCEDURE — 99213 OFFICE O/P EST LOW 20 MIN: CPT | Performed by: OBSTETRICS & GYNECOLOGY

## 2025-04-22 PROCEDURE — 86780 TREPONEMA PALLIDUM: CPT

## 2025-04-22 PROCEDURE — 86803 HEPATITIS C AB TEST: CPT

## 2025-04-22 PROCEDURE — 87491 CHLMYD TRACH DNA AMP PROBE: CPT | Performed by: OBSTETRICS & GYNECOLOGY

## 2025-04-22 PROCEDURE — 81003 URINALYSIS AUTO W/O SCOPE: CPT | Performed by: OBSTETRICS & GYNECOLOGY

## 2025-04-22 PROCEDURE — 87389 HIV-1 AG W/HIV-1&-2 AB AG IA: CPT

## 2025-04-22 PROCEDURE — 87340 HEPATITIS B SURFACE AG IA: CPT

## 2025-04-22 NOTE — LETTER
2025       RE: Ruth Cortes  [address]  [facility] [address]     Dear Colleague,    Thank you for referring your patient, Ruth Cortes, to the [facility] at [facility]. Please see a copy of my visit note below.    [facility]    CC: Routine STI screening, UTI symptoms    Subjective:   Ruth Cortes is a 24 year old  who presents for routine STI screening, as well as some recent UTI symptoms. She has some burning/discomfort near her urethra since Saturday. She notes that it is not with urination, but generally present mildly since Saturday. No other concerns; no abnormal vaginal discharge, vaginal discomfort, irritation. No concern for exposure to STI.     Screening tests:   Pap smear history: [facility] 2024  HIV, Hep C, Hep B, RPR today  Lipid and diabetes per PCP    Immunizations, reviewed: due for meningitis B vaccine    OBSTETRIC/GYNECOLOGIC HISTORY  OB History    Para Term  AB Living   0 0 0 0 0 0   SAB IAB Ectopic Multiple Live Births   0 0 0 0 0       LMP: Patient's last menstrual period was 03/15/2025 (approximate).  Menarche: age 13  Menses: Minimal bleeding with Mirena IUD  Current contraception: Mirena IUD, placed 2019  Sexual activity: sexually active  History of STIs: None. She is interested in STI screening today.   History of abnormal Pap smear: No    PAST MEDICAL HISTORY  Past Medical History:   Diagnosis Date     Hypertension      Skin reaction to the sun 2006       MEDICATIONS  Current Outpatient Medications   Medication Sig Dispense Refill     levonorgestrel (MIRENA) 52 MG (20 mcg/day) IUD        albuterol (PROAIR HFA/PROVENTIL HFA/VENTOLIN HFA) 108 (90 Base) MCG/ACT inhaler Inhale 1-2 puffs into the lungs every 6 hours as needed for shortness of breath / dyspnea or wheezing (Patient not taking: Reported on 2025) 18 g 3     No  "current facility-administered medications for this visit.       ALLERGIES  Allergies   Allergen Reactions     No Known Allergies        PAST SURGICAL HISTORY   Past Surgical History:   Procedure Laterality Date     NO HISTORY OF SURGERY         SOCIAL HISTORY    Social History     Tobacco Use     Smoking status: Never     Passive exposure: Never     Smokeless tobacco: Never   Vaping Use     Vaping status: Never Used   Substance Use Topics     Alcohol use: Yes     Comment: 5 drinks a week     Drug use: No     Social History     Social History Narrative    Family Information:    Parent #1    Name: Michelle    Education: college   Occupation: HR rep        Relationship Status of Parent(s): single    Who does the child live with? mother and sister(s)    What language(s) is/are spoken at home? English        How much exercise per week? Swimming daily    How much calcium per day? In foods       How much caffeine per day? 0    How much vitamin D per day? In foods and sunligth    Do you/your family wear seatbelts?  Yes    Do you/your family use safety helmets? Yes    Do you/your family use sunscreen? Yes    Do you/your family keep firearms in the home? No    Do you/your family have a smoke detector(s)? Yes        Reviewed [facility] 3-           FAMILY HISTORY    Family History   Problem Relation Age of Onset     Hypertension Maternal Grandmother      Hypertension Maternal Uncle      Breast Cancer No family hx of      Colon Cancer No family hx of      Thyroid Disease No family hx of      Ovarian Cancer No family hx of        REVIEW OF SYSTEMS  A 10 point review of systems were all negative, except for pertinent positives noted in the above HPI.    OBJECTIVE:   /82   Pulse 76   Ht 1.702 m (5' 7\")   Wt 64.8 kg (142 lb 12.8 oz)   LMP 03/15/2025 (Approximate)   BMI 22.37 kg/m   (Initial /82)  General: Alert, without distress  HEENT: normocephalic  Cardiovascular: regular rate and rhythm  Lungs: unlabored " breathing on room air  Pelvic: Small 1-2mm mole noted on right labia minora without concerning features. No ulcers or lesions noted on external genitalia. Vaginal mucosa well estrogenized with minimal discharge in the vaginal vault. Normal cervix without lesions/masses or discoloration. IUD strings visualized through os.  Extremities: normal    ASSESSMENT AND PLAN  Ruth Cortes is a 24 year old  who presents for a routine STI screening and burning near her urethra.    Routine health screening  - STI Screening:   STD testing: GC/CT, trich; HIV, RPR, Hep B, Hep C collected today   Cervical cancer: last pap ; repeat   - Immunizations:   Last Tdap 2024   HPV completed    MMR completed    Varicella completed    Due for meningitis B vaccine; recommended making appointment at [facility], will place order  - Contraception: Mirena IUD in place since     Urethral discomfort  - UA wnl, will not reflex to culture.   - Multiplex collected; will call patient if results are positive    Follow-up in 1 year, sooner as needed if any concerns. Patient seen and discussed with Dr. Hernadez.    Jessica Flores MD, MSc  [facility], PGY-1  2025 10:19 AM      Attestation signed by Anupama Hernadez MD at 2025 11:32 AM:  OBGYN Attending Addendum     I, Anupama Hernadez, saw Ruth Cortes with the resident, Dr. Flores, and agree with their findings and plan of care as documented in the above note.    I personally reviewed vitals, meds, labs. I directly supervised the physical exam.     Key findings: 24 year old G0 here for STI testing.      I agree with the plan for multiplex, gc/ct PCR, HIV, hepatitis B/C, RPR. UA without evidence of UTI. Exam otherwise reassuring. Ruth will return if her dysuria does not resolve.    Anupama Hernadez MD, MSCI  Date of Service: 2025      Again, thank you for allowing me to participate in the care of your patient.      Sincerely,    Anupama Hernadez MD

## 2025-04-22 NOTE — PATIENT INSTRUCTIONS
Thank you for trusting us with your care!   Please be aware, if you are on Mychart, you may see your results prior to your providers review. If labs are abnormal, we will call or message you on [facility] with a follow up plan.    If you need to contact us for questions about:  Symptoms, Scheduling & Medical Questions; Non-urgent (2-3 day response) Rudder message, Urgent (needing response today) [phone] (if after 3:30pm next day response)   Prescriptions: Please call your Pharmacy   Billing: [facility] [phone] or [facility]:[phone]

## 2025-04-23 LAB
C TRACH DNA SPEC QL PROBE+SIG AMP: NEGATIVE
N GONORRHOEA DNA SPEC QL NAA+PROBE: NEGATIVE
SPECIMEN TYPE: NORMAL

## 2025-04-28 ENCOUNTER — PATIENT OUTREACH (OUTPATIENT)
Dept: CARE COORDINATION | Facility: CLINIC | Age: 25
End: 2025-04-28
Payer: COMMERCIAL

## 2025-05-29 ENCOUNTER — LAB (OUTPATIENT)
Dept: LAB | Facility: CLINIC | Age: 25
End: 2025-05-29
Attending: NURSE PRACTITIONER
Payer: COMMERCIAL

## 2025-05-29 ENCOUNTER — RESULTS FOLLOW-UP (OUTPATIENT)
Dept: OBGYN | Facility: CLINIC | Age: 25
End: 2025-05-29

## 2025-05-29 ENCOUNTER — OFFICE VISIT (OUTPATIENT)
Dept: OBGYN | Facility: CLINIC | Age: 25
End: 2025-05-29
Attending: NURSE PRACTITIONER
Payer: COMMERCIAL

## 2025-05-29 VITALS
HEART RATE: 83 BPM | WEIGHT: 134.9 LBS | BODY MASS INDEX: 20.45 KG/M2 | HEIGHT: 68 IN | SYSTOLIC BLOOD PRESSURE: 122 MMHG | DIASTOLIC BLOOD PRESSURE: 83 MMHG

## 2025-05-29 DIAGNOSIS — Z11.3 SCREEN FOR STD (SEXUALLY TRANSMITTED DISEASE): ICD-10-CM

## 2025-05-29 DIAGNOSIS — Z13.220 SCREENING FOR LIPID DISORDERS: ICD-10-CM

## 2025-05-29 DIAGNOSIS — Z30.431 INTRAUTERINE DEVICE SURVEILLANCE: ICD-10-CM

## 2025-05-29 DIAGNOSIS — Z00.00 VISIT FOR PREVENTIVE HEALTH EXAMINATION: ICD-10-CM

## 2025-05-29 DIAGNOSIS — Z00.00 VISIT FOR PREVENTIVE HEALTH EXAMINATION: Primary | ICD-10-CM

## 2025-05-29 LAB
CHOLEST SERPL-MCNC: 177 MG/DL
FASTING STATUS PATIENT QL REPORTED: NO
HDLC SERPL-MCNC: 76 MG/DL
HIV 1+2 AB+HIV1 P24 AG SERPL QL IA: NONREACTIVE
LDLC SERPL CALC-MCNC: 89 MG/DL
NONHDLC SERPL-MCNC: 101 MG/DL
T PALLIDUM AB SER QL: NONREACTIVE
TRIGL SERPL-MCNC: 59 MG/DL

## 2025-05-29 PROCEDURE — 82465 ASSAY BLD/SERUM CHOLESTEROL: CPT

## 2025-05-29 PROCEDURE — 99213 OFFICE O/P EST LOW 20 MIN: CPT | Performed by: NURSE PRACTITIONER

## 2025-05-29 PROCEDURE — 87389 HIV-1 AG W/HIV-1&-2 AB AG IA: CPT

## 2025-05-29 PROCEDURE — 36415 COLL VENOUS BLD VENIPUNCTURE: CPT

## 2025-05-29 PROCEDURE — 87491 CHLMYD TRACH DNA AMP PROBE: CPT | Performed by: NURSE PRACTITIONER

## 2025-05-29 PROCEDURE — 86780 TREPONEMA PALLIDUM: CPT

## 2025-05-29 RX ORDER — MULTIVITAMIN WITH IRON
1 TABLET ORAL DAILY
COMMUNITY

## 2025-05-29 RX ORDER — CLINDAMYCIN PHOSPHATE 10 UG/ML
LOTION TOPICAL
COMMUNITY
Start: 2025-04-19

## 2025-05-29 NOTE — PATIENT INSTRUCTIONS
Thank you for trusting us with your care!   Please be aware, if you are on Mychart, you may see your results prior to your providers review. If labs are abnormal, we will call or message you on [email or web address] with a follow up plan.    If you need to contact us for questions about:  Symptoms, Scheduling & Medical Questions; Non-urgent (2-3 day response) [place]ster message, Urgent (needing response today) [phone] (if after 3:30pm next day response)   Prescriptions: Please call your Pharmacy   Billing: [facility] [phone] or [facility]:[phone]

## 2025-05-29 NOTE — LETTER
2025       RE: Ruth Cortes  [address]  [facility] [address]     Dear Colleague,    Thank you for referring your patient, Ruth Cortes, to the [facility] [place] at [facility]. Please see a copy of my visit note below.    Progress Note    SUBJECTIVE:  Ruth Cortes is an 24 year old, , who requests an Annual Preventive Exam.     Concerns today include:    STD testing today: no known exposures, no symptoms; the dysuria she had experienced last month has resolved. Neg STD testing 2025.   Mirena IUD surveillance: placed 2019. Has noticed an increase in menstrual bleeding; menses are more regular - monthly - and now having to use regular tampon instead of light. Has also noticed more cramping coming back. No spotting between periods or post-coital.  Noticed change to right nipple, bottom now less inverted than it had been. No fam hx of breast cancer. No breast lumps, breast pain, or nipple discharge.     Social hx: moved to [facility] in August from [place] and will be moving to [place] for grad in August; will be studying industrial and [employer].      Mental health: feeling well; no concerns.     Pap smear:  NIL - no hx of abnormal pap smears.     Sexual Hx:   - No sexual concerns     Diet/Exercise   - Doesn't follow a specific diet. Overall well balanced; good some calcium in her diet  - Exercises: lifts and goes to yoga; does about 6 workouts per week.      Mammogram: n/a  Colonoscopy: n/a    Menstrual History:      2024     2:20 PM 2025     9:30 AM 2025     8:00 AM   Menstrual History   LAST MENSTRUAL PERIOD 2024 3/15/2025 2025       HISTORY:  Current Outpatient Medications   Medication Sig Dispense Refill     clindamycin (CLEOCIN T) 1 % external lotion APPLY TO FACE EVERY DAY       levonorgestrel (MIRENA) 52 MG (20 mcg/day) IUD         magnesium 250 MG tablet Take 1 tablet by mouth daily.       No current facility-administered medications for this visit.     Allergies   Allergen Reactions     No Known Allergies      Immunization History   Administered Date(s) Administered     COVID-19 12+ (MODERNA) 10/30/2024     COVID-19 MONOVALENT 12+ (Pfizer) 2021, 2021     DTAP (<7y) 2002, 2002, 2003, 10/07/2003, 2007     Flu, Unspecified 10/21/2020     HEPA 2005, 10/31/2006     HIB (PRP-T) 2002, 2002, 2003, 10/07/2003     HPV 2013, 2016     HepB 2002, 2002, 2003     Hepatitis A (Vaqta/Havrix)(Peds 12m-18y) 2005, 10/31/2006     Hepatitis B, Peds (Engerix-B/Recombivax HB) 2002, 2003     INFLUENZA,TRIVALENT (FLUCELVAX) 10/30/2024     Influenza (H1N1) 2009, 2010     Influenza (IIV3) PF 2003, 10/26/2005, 10/31/2006, 10/30/2007, 2009, 2010     Influenza (prior to ) 10/31/2006, 2009, 2010     MMR (MMRII) 2003, 2007     Meningococcal ACWY (Menactra ) 2013, 2018     Meningococcal ACWY (Menveo ) 2013     Pneumococcal (PCV 7) 2002, 2002, 2003, 10/07/2003     Poliovirus, inactivated (IPV) 2002, 2002, 2003, 2007     TDAP (Adacel,Boostrix) 2024     TDAP Vaccine (Boostrix) 2013     Varicella (Varivax) 2003, 2007       OB History    Para Term  AB Living   0 0 0 0 0 0   SAB IAB Ectopic Multiple Live Births   0 0 0 0 0     Past Medical History:   Diagnosis Date     Hypertension      Skin reaction to the sun 2006     Past Surgical History:   Procedure Laterality Date     WISDOM TOOTH EXTRACTION Bilateral      Family History   Problem Relation Age of Onset     Hypertension Maternal Grandmother      Hypertension Maternal Uncle      Breast Cancer No family hx of      Colon Cancer No family hx of      Thyroid  "Disease No family hx of      Ovarian Cancer No family hx of      Social History     Socioeconomic History     Marital status: Single     Spouse name: None     Number of children: None     Years of education: None     Highest education level: None   Tobacco Use     Smoking status: Never     Passive exposure: Never     Smokeless tobacco: Never   Vaping Use     Vaping status: Never Used   Substance and Sexual Activity     Alcohol use: Yes     Comment: 5 drinks a week     Drug use: No     Sexual activity: Yes     Partners: Male     Birth control/protection: Condom, I.U.D.                                                                                          ROS  ROS: 10 point ROS neg other than the symptoms noted above in the HPI.    PHQ-2 Score:         4/21/2025     9:51 AM 5/13/2024    10:07 PM   PHQ-2 ( 1999 Pfizer)   Q1: Little interest or pleasure in doing things 0 0   Q2: Feeling down, depressed or hopeless 0 0   PHQ-2 Score 0  0   Q1: Little interest or pleasure in doing things Not at all Not at all   Q2: Feeling down, depressed or hopeless Not at all Not at all   PHQ-2 Score 0 0       Patient-reported           3/28/2018     1:29 PM 11/5/2018     2:04 PM 9/10/2019    11:40 AM   PHQ-9 SCORE   PHQ-9 Total Score 1   2   PHQ-A Total Score  1         Data saved with a previous flowsheet row definition         3/28/2018     1:29 PM 9/10/2019    11:40 AM 5/13/2024    10:07 PM   MAIRA-7 SCORE   Total Score   0 (minimal anxiety)   Total Score 3 6 0     EXAM:  Blood pressure 122/83, pulse 83, height 1.715 m (5' 7.5\"), weight 61.2 kg (134 lb 14.4 oz), last menstrual period 05/19/2025, not currently breastfeeding. Body mass index is 20.82 kg/m .  General - pleasant female in no acute distress.  Skin - no suspicious lesions or rashes  EENT-  euthyroid with out palpable nodules  Neck - supple without lymphadenopathy.  Lungs - clear to auscultation bilaterally.  Heart - regular rate and rhythm without murmur.  Abdomen - soft, " nontender, nondistended, no masses or organomegaly noted.  Musculoskeletal - no gross deformities.  Neurological - normal strength, sensation, and mental status.    Breast Exam:   Breasts: normal without suspicious masses, skin changes or axillary nodes; nipples become everted and equal bilaterally with exam    Pelvic Exam:  EG/BUS: Normal genital architecture without lesions, erythema or abnormal secretions; Bartholin's, Urethra, [place]'s normal   Urethral meatus: normal   Urethra: no masses, tenderness, or scarring   Vagina: moist, pink, rugae with creamy, white, and odorless secretions  Cervix: pink, moist, closed, without lesion and IUD strings extend 3 cm from external os.  Rectum: anus normal     ASSESSMENT/ PLAN:  1. Visit for preventive health examination (Primary)  - Gonorrhea PCR  - Chlamydia trachomatis PCR  - Lipid panel reflex to direct LDL Fasting; Future  - HIV Antigen Antibody Combo; Future  - Treponema Abs w Reflex to RPR and Titer; Future  - Immunizations are up to date  - next pap smear due 2027  - normal breast exam; recommend continued self-breast awareness    2. Screen for STD (sexually transmitted disease)  - Gonorrhea PCR  - Chlamydia trachomatis PCR  - HIV Antigen Antibody Combo; Future  - Treponema Abs w Reflex to RPR and Titer; Future    3. Screening for lipid disorders  - Lipid panel reflex to direct LDL Fasting; Future    4. IUD surveillance:  - Pt has had an increase in menstrual bleeding and desires to have her IUD removed and replaced prior to the expiration date. Has been in place almost 6 yrs. Pt assisted to schedule an appointment for this. Counseled on options for comfort measures.      Return to clinic in one year.  Follow-up as needed.    Vero Brewer, RADHA, APRN, WHNP              Again, thank you for allowing me to participate in the care of your patient.      Sincerely,    Vero Brewer, LES CNP

## 2025-05-29 NOTE — PROGRESS NOTES
Progress Note    SUBJECTIVE:  Ruth Cortes is an 24 year old, , who requests an Annual Preventive Exam.     Concerns today include:    STD testing today: no known exposures, no symptoms; the dysuria she had experienced last month has resolved. Neg STD testing 2025.   Mirena IUD surveillance: placed 2019. Has noticed an increase in menstrual bleeding; menses are more regular - monthly - and now having to use regular tampon instead of light. Has also noticed more cramping coming back. No spotting between periods or post-coital.  Noticed change to right nipple, bottom now less inverted than it had been. No fam hx of breast cancer. No breast lumps, breast pain, or nipple discharge.     Social hx: moved to [facility] in August from [place] and will be moving to [place] for grad in August; will be studying industrial and organizational [employer].      Mental health: feeling well; no concerns.     Pap smear:  NIL - no hx of abnormal pap smears.     Sexual Hx:   - No sexual concerns     Diet/Exercise   - Doesn't follow a specific diet. Overall well balanced; good some calcium in her diet  - Exercises: lifts and goes to yoga; does about 6 workouts per week.      Mammogram: n/a  Colonoscopy: n/a    Menstrual History:      2024     2:20 PM 2025     9:30 AM 2025     8:00 AM   Menstrual History   LAST MENSTRUAL PERIOD 2024 3/15/2025 2025       HISTORY:  Current Outpatient Medications   Medication Sig Dispense Refill    clindamycin (CLEOCIN T) 1 % external lotion APPLY TO FACE EVERY DAY      levonorgestrel (MIRENA) 52 MG (20 mcg/day) IUD       magnesium 250 MG tablet Take 1 tablet by mouth daily.       No current facility-administered medications for this visit.     Allergies   Allergen Reactions    No Known Allergies      Immunization History   Administered Date(s) Administered    COVID-19 12+ (MODERNA) 10/30/2024    COVID-19 MONOVALENT 12+ (Pfizer) 2021, 2021     DTAP (<7y) 2002, 2002, 2003, 10/07/2003, 2007    Flu, Unspecified 10/21/2020    HEPA 2005, 10/31/2006    HIB (PRP-T) 2002, 2002, 2003, 10/07/2003    HPV 2013, 2016    HepB 2002, 2002, 2003    Hepatitis A (Vaqta/Havrix)(Peds 12m-18y) 2005, 10/31/2006    Hepatitis B, Peds (Engerix-B/Recombivax HB) 2002, 2003    INFLUENZA,TRIVALENT (FLUCELVAX) 10/30/2024    Influenza (H1N1) 2009, 2010    Influenza (IIV3) PF 2003, 10/26/2005, 10/31/2006, 10/30/2007, 2009, 2010    Influenza (prior to ) 10/31/2006, 2009, 2010    MMR (MMRII) 2003, 2007    Meningococcal ACWY (Menactra ) 2013, 2018    Meningococcal ACWY (Menveo ) 2013    Pneumococcal (PCV 7) 2002, 2002, 2003, 10/07/2003    Poliovirus, inactivated (IPV) 2002, 2002, 2003, 2007    TDAP (Adacel,Boostrix) 2024    TDAP Vaccine (Boostrix) 2013    Varicella (Varivax) 2003, 2007       OB History    Para Term  AB Living   0 0 0 0 0 0   SAB IAB Ectopic Multiple Live Births   0 0 0 0 0     Past Medical History:   Diagnosis Date    Hypertension     Skin reaction to the sun 2006     Past Surgical History:   Procedure Laterality Date    WISDOM TOOTH EXTRACTION Bilateral      Family History   Problem Relation Age of Onset    Hypertension Maternal Grandmother     Hypertension Maternal Uncle     Breast Cancer No family hx of     Colon Cancer No family hx of     Thyroid Disease No family hx of     Ovarian Cancer No family hx of      Social History     Socioeconomic History    Marital status: Single     Spouse name: None    Number of children: None    Years of education: None    Highest education level: None   Tobacco Use    Smoking status: Never     Passive exposure: Never    Smokeless tobacco: Never   Vaping Use    Vaping status:  "Never Used   Substance and Sexual Activity    Alcohol use: Yes     Comment: 5 drinks a week    Drug use: No    Sexual activity: Yes     Partners: Male     Birth control/protection: Condom, I.U.D.                                                                                          ROS  ROS: 10 point ROS neg other than the symptoms noted above in the HPI.    PHQ-2 Score:         4/21/2025     9:51 AM 5/13/2024    10:07 PM   PHQ-2 ( 1999 Pfizer)   Q1: Little interest or pleasure in doing things 0 0   Q2: Feeling down, depressed or hopeless 0 0   PHQ-2 Score 0  0   Q1: Little interest or pleasure in doing things Not at all Not at all   Q2: Feeling down, depressed or hopeless Not at all Not at all   PHQ-2 Score 0 0       Patient-reported           3/28/2018     1:29 PM 11/5/2018     2:04 PM 9/10/2019    11:40 AM   PHQ-9 SCORE   PHQ-9 Total Score 1   2   PHQ-A Total Score  1         Data saved with a previous flowsheet row definition         3/28/2018     1:29 PM 9/10/2019    11:40 AM 5/13/2024    10:07 PM   MAIRA-7 SCORE   Total Score   0 (minimal anxiety)   Total Score 3 6 0     EXAM:  Blood pressure 122/83, pulse 83, height 1.715 m (5' 7.5\"), weight 61.2 kg (134 lb 14.4 oz), last menstrual period 05/19/2025, not currently breastfeeding. Body mass index is 20.82 kg/m .  General - pleasant female in no acute distress.  Skin - no suspicious lesions or rashes  EENT-  euthyroid with out palpable nodules  Neck - supple without lymphadenopathy.  Lungs - clear to auscultation bilaterally.  Heart - regular rate and rhythm without murmur.  Abdomen - soft, nontender, nondistended, no masses or organomegaly noted.  Musculoskeletal - no gross deformities.  Neurological - normal strength, sensation, and mental status.    Breast Exam:   Breasts: normal without suspicious masses, skin changes or axillary nodes; nipples become everted and equal bilaterally with exam    Pelvic Exam:  EG/BUS: Normal genital architecture without " lesions, erythema or abnormal secretions; Bartholin's, Urethra, [place]'s normal   Urethral meatus: normal   Urethra: no masses, tenderness, or scarring   Vagina: moist, pink, rugae with creamy, white, and odorless secretions  Cervix: pink, moist, closed, without lesion and IUD strings extend 3 cm from external os.  Rectum: anus normal     ASSESSMENT/ PLAN:  1. Visit for preventive health examination (Primary)  - Gonorrhea PCR  - Chlamydia trachomatis PCR  - Lipid panel reflex to direct LDL Fasting; Future  - HIV Antigen Antibody Combo; Future  - Treponema Abs w Reflex to RPR and Titer; Future  - Immunizations are up to date  - next pap smear due 2027  - normal breast exam; recommend continued self-breast awareness    2. Screen for STD (sexually transmitted disease)  - Gonorrhea PCR  - Chlamydia trachomatis PCR  - HIV Antigen Antibody Combo; Future  - Treponema Abs w Reflex to RPR and Titer; Future    3. Screening for lipid disorders  - Lipid panel reflex to direct LDL Fasting; Future    4. IUD surveillance:  - Pt has had an increase in menstrual bleeding and desires to have her IUD removed and replaced prior to the expiration date. Has been in place almost 6 yrs. Pt assisted to schedule an appointment for this. Counseled on options for comfort measures.      Return to clinic in one year.  Follow-up as needed.    Vero Brewer, RADHA, APRN, WHNP

## 2025-06-23 ENCOUNTER — OFFICE VISIT (OUTPATIENT)
Dept: OBGYN | Facility: CLINIC | Age: 25
End: 2025-06-23
Payer: COMMERCIAL

## 2025-06-23 VITALS
DIASTOLIC BLOOD PRESSURE: 78 MMHG | HEART RATE: 90 BPM | HEIGHT: 68 IN | SYSTOLIC BLOOD PRESSURE: 146 MMHG | BODY MASS INDEX: 20.82 KG/M2

## 2025-06-23 DIAGNOSIS — Z30.433 ENCOUNTER FOR REMOVAL AND REINSERTION OF INTRAUTERINE CONTRACEPTIVE DEVICE: Primary | ICD-10-CM

## 2025-06-23 PROCEDURE — 58300 INSERT INTRAUTERINE DEVICE: CPT

## 2025-06-23 PROCEDURE — 58301 REMOVE INTRAUTERINE DEVICE: CPT

## 2025-06-23 PROCEDURE — 250N000011 HC RX IP 250 OP 636

## 2025-06-23 PROCEDURE — 3077F SYST BP >= 140 MM HG: CPT

## 2025-06-23 PROCEDURE — 99213 OFFICE O/P EST LOW 20 MIN: CPT

## 2025-06-23 PROCEDURE — 3078F DIAST BP <80 MM HG: CPT

## 2025-06-23 RX ORDER — AMOXICILLIN 500 MG/1
500 CAPSULE ORAL 2 TIMES DAILY
COMMUNITY
Start: 2025-06-05

## 2025-06-23 RX ADMIN — LEVONORGESTREL 1 EACH: 52 INTRAUTERINE DEVICE INTRAUTERINE at 16:39

## 2025-06-23 NOTE — PROGRESS NOTES
SUBJECTIVE:    Is a pregnancy test required: No.  Was a consent obtained?  Yes    Subjective: Ruth Cortes is a 24 year old  presents for IUD and desires Mirena type IUD.    Patient has been given the opportunity to ask questions about all forms of birth control, including all options appropriate for Ruth Cortes. Discussed that no method of birth control, except abstinence is 100% effective against pregnancy or sexually transmitted infection.     Ruth Cortes understands she may have the IUD removed at any time. IUD should be removed by a health care provider and the current IUD will be removed today.    The entire removal and insertion procedure was reviewed with the patient, including care after placement.    Today's PHQ-2 Score:       2025     9:51 AM   PHQ-2 (  Pfizer)   Q1: Little interest or pleasure in doing things 0   Q2: Feeling down, depressed or hopeless 0   PHQ-2 Score 0    Q1: Little interest or pleasure in doing things Not at all   Q2: Feeling down, depressed or hopeless Not at all   PHQ-2 Score 0       Patient-reported       PROCEDURE:    Premedicated with ibuprofen. Paracervical block performed.  A speculum exam was performed and the cervix was visualized. The IUD string was visualized. Using ring forceps, the string  was grasped and the IUD removed intact.    Under sterile technique, cervix was visualized with speculum and prepped with Betadine solution swab x 3. Tenaculum was placed for stability. The uterus was gently straightened and sounded to 7.5 cm. IUD prepared for placement, and IUD inserted according to 's instructions without difficulty or significant ressitance, and deployed at the fundus. The strings were visualized and trimmed to 2.0 cm from the external os. Tenaculum was removed and hemostasis noted. Speculum removed.  Patient tolerated procedure well.    EBL: minimal    Complications: none      POST PROCEDURE:    Given 's  handouts, including when to have IUD removed, list of danger s/sx, side effects and follow up recommended. Encouraged condom use for prevention of STD. Advised to call for any fever, for prolonged or severe pain or bleeding, abnormal vaginal dischage, or unable to palpate strings. She was advised to use pain medications (ibuprofen) as needed for mild to moderate pain. Advised to follow-up in clinic in 4-6 weeks for IUD string check if unable to find strings or as directed by provider.     LES KeenM

## 2025-06-23 NOTE — LETTER
2025       RE: Ruth Cortes  [address]  [facility] [address]     Dear Colleague,    Thank you for referring your patient, Ruth Cortes, to the [facility] [place] at [facility]. Please see a copy of my visit note below.    SUBJECTIVE:    Is a pregnancy test required: No.  Was a consent obtained?  Yes    Subjective: Ruth Cortes is a 24 year old  presents for IUD and desires Mirena type IUD.    Patient has been given the opportunity to ask questions about all forms of birth control, including all options appropriate for Ruth Cortes. Discussed that no method of birth control, except abstinence is 100% effective against pregnancy or sexually transmitted infection.     Ruth Cortes understands she may have the IUD removed at any time. IUD should be removed by a health care provider and the current IUD will be removed today.    The entire removal and insertion procedure was reviewed with the patient, including care after placement.    Today's PHQ-2 Score:       2025     9:51 AM   PHQ-2 (  Pfizer)   Q1: Little interest or pleasure in doing things 0   Q2: Feeling down, depressed or hopeless 0   PHQ-2 Score 0    Q1: Little interest or pleasure in doing things Not at all   Q2: Feeling down, depressed or hopeless Not at all   PHQ-2 Score 0       Patient-reported       PROCEDURE:    Premedicated with ibuprofen. Paracervical block performed.  A speculum exam was performed and the cervix was visualized. The IUD string was visualized. Using ring forceps, the string  was grasped and the IUD removed intact.    Under sterile technique, cervix was visualized with speculum and prepped with Betadine solution swab x 3. Tenaculum was placed for stability. The uterus was gently straightened and sounded to 7.5 cm. IUD prepared for placement, and IUD inserted according to 's instructions  without difficulty or significant ressitance, and deployed at the fundus. The strings were visualized and trimmed to 2.0 cm from the external os. Tenaculum was removed and hemostasis noted. Speculum removed.  Patient tolerated procedure well.    EBL: minimal    Complications: none      POST PROCEDURE:    Given 's handouts, including when to have IUD removed, list of danger s/sx, side effects and follow up recommended. Encouraged condom use for prevention of STD. Advised to call for any fever, for prolonged or severe pain or bleeding, abnormal vaginal dischage, or unable to palpate strings. She was advised to use pain medications (ibuprofen) as needed for mild to moderate pain. Advised to follow-up in clinic in 4-6 weeks for IUD string check if unable to find strings or as directed by provider.     LES Keen CNM      Again, thank you for allowing me to participate in the care of your patient.      Sincerely,    LES Keen CNM

## 2025-06-23 NOTE — PATIENT INSTRUCTIONS
"Subjective: \" I doing well and I can get up and down the stairs with rail and I have no problem. I have been doing my exercises. \" I am kind of nervous today\"      Plan for next visit: Review HEP and ambulation program while monitoring vital signs." Thank you for trusting us with your care!   Please be aware, if you are on Mychart, you may see your results prior to your providers review. If labs are abnormal, we will call or message you on [employer] with a follow up plan.    If you need to contact us for questions about:  Symptoms, Scheduling & Medical Questions; Non-urgent (2-3 day response) [employer] message, Urgent (needing response today) [phone] (if after 3:30pm next day response)   Prescriptions: Please call your Pharmacy   Billing: [place] [phone] or [facility]:[phone]

## (undated) RX ORDER — LIDOCAINE HYDROCHLORIDE 10 MG/ML
INJECTION, SOLUTION INFILTRATION; PERINEURAL
Status: DISPENSED
Start: 2025-06-23